# Patient Record
Sex: FEMALE | Race: WHITE | URBAN - METROPOLITAN AREA
[De-identification: names, ages, dates, MRNs, and addresses within clinical notes are randomized per-mention and may not be internally consistent; named-entity substitution may affect disease eponyms.]

---

## 2018-04-13 ENCOUNTER — HOSPITAL ENCOUNTER (OUTPATIENT)
Dept: PHYSICAL THERAPY | Age: 73
Discharge: HOME | End: 2018-04-30
Admitting: NURSE PRACTITIONER

## 2018-05-01 ENCOUNTER — HOSPITAL ENCOUNTER (OUTPATIENT)
Dept: OTHER | Age: 73
Discharge: HOME | End: 2018-05-31
Attending: NURSE PRACTITIONER | Admitting: NURSE PRACTITIONER

## 2018-05-31 ENCOUNTER — HOSPITAL ENCOUNTER (OUTPATIENT)
Dept: PHYSICAL THERAPY | Age: 73
Discharge: HOME | End: 2018-06-30
Admitting: NURSE PRACTITIONER

## 2018-05-31 NOTE — FLOWSHEET NOTE
Good [] Fair  [] Poor    Patient Requires Follow-up: [] Yes  [x] No    Plan:   [] Continue per plan of care [] Alter current plan (see comments)  [] Plan of care initiated [] Hold pending MD visit [x] Discharge  Plan for Next Session:      Electronically signed by:  Ori Jorgensen PT

## 2018-05-31 NOTE — PROGRESS NOTES
Outpatient Physical Therapy    Phone: [phone] Fax: [phone]    Physical Therapy Discharge Note  Date: 2018        Patient Name:  Jcarlos Wood    :  1945  MRN: [id number]  Restrictions/Precautions:      Medical/Treatment Diagnosis Information:  · Diagnosis: urge incontinence  · Treatment Diagnosis: decreased pelvic floor and core strength with improved continence  Insurance/Certification information:  PT Insurance Information: [employer]  Physician Information:  Referring Practitioner: Juan Miguel Modi CNP  Plan of care signed (Y/N):yes  Visit# / total visits: 6/7  Pain level: 0/10     G-Code (if applicable):      Date G-Code Applied:  2018  PT G-Codes  Functional Assessment Tool Used: PT assessment  Functional Limitation: Other PT primary  Other PT Primary Goal Status (): 0 percent impaired, limited or restricted  Other PT Primary Discharge Status (): At least 1 percent but less than 20 percent impaired, limited or restricted     Time Period for Report:  18  Cancels/No-shows to date:      Plan of Care/Treatment to date:  [x] Therapeutic Exercise      [] Modalities:  [x] Therapeutic Activity       [] Ultrasound    [] Gait Training        [] Cervical Traction   [x] Neuromuscular Re-education      [] Cold/hotpack    [x] Instruction in HEP        [] Lumbar Traction  [x] Manual Therapy        [] Electrical Stimulation            [] Aquatic Therapy        [] Iontophoresis        ? [] Lymphedema management  [] Women's Health     Other:  [] Vestibular Rehab        []                     ? Significant Findings At Last Visit/Comments:    Subjective:  Subjective  Subjective: Pt reports she still has some leakage when she is sleepy. She is better during the day most of the time. Still leaks enough to wear pads on days she is sleepy.            Objective:  Ortho Screen  Posture: good  Pelvic Alignment: WNL's  Ortho Screen: neg  Abdominals  Scarring: neg  Diatasis: neg   Functional Stability: WNL\"s  Abdominals: fair  Pelvic Floor  Introitus: WNL  Skin Condition: WNL's  External Clock: WNL  Muscle Bulk: WNL's  Muscle Power: 4+/5  Muscle Endurance (Seconds): 7 Seconds  Number Reps to Fatigue: 8  Muscle Flicks: good  Quality of Contraction: good  Specificity: good  Coordination: good      Assessment:  Conditions Requiring Skilled Therapeutic Intervention  Body structures, Functions, Activity limitations: Decreased functional mobility , Decreased ADL status, Decreased strength, Decreased endurance, Decreased coordination, Decreased high-level IADLs  Assessment: Pt presents with improved pelvic floor and core strength, still experiencing some incontinence when she is sleepy  Treatment Diagnosis: decreased pelvic floor and core strength with improved continence  REQUIRES PT FOLLOW UP: No    Plan:   Plan  Specific instructions for Next Treatment: Discharge    Progress towards goals:    Long term goals  Time Frame for Long term goals : 6 weeks  Long term goal 1: Pt will be able to perform normal aDL's without urine leakage MET when awake  Long term goal 2: Pt will be able to demo HEP for core and pelvic floor to maintain continence. MET  Long term goal 3: Pt will be able to sleep through the night without needing to go to the bathroom. NOT MET but less than before    Current Frequency/Duration:  # Days per week: [x] 1 day # Weeks: [] 1 week [] 4 weeks      [] 2 days?    [] 2 weeks [] 5 weeks      [] 3 days   [] 3 weeks [x] 6 weeks     Rehab Potential: [] Excellent [x] Good [] Fair  [] Poor     Goal Status:  [] Achieved [x] Partially Achieved  [] Not Achieved     Patient Status: [] Continue per initial plan of Care     [x] Patient now discharged     [] Additional visits requested, Please re-certify for additional visits:      Requested frequency/duration:  X/week for weeks    Electronically signed by:  Michelle Barton, PT    If you have any questions or concerns, please don't hesitate to call.  Thank you for your referral.    Physician Signature:________________________________Date:__________________  By signing above, therapists plan is approved by physician

## 2018-06-01 ENCOUNTER — HOSPITAL ENCOUNTER (OUTPATIENT)
Dept: OTHER | Age: 73
Discharge: HOME | End: 2018-06-01
Attending: NURSE PRACTITIONER | Admitting: NURSE PRACTITIONER